# Patient Record
Sex: FEMALE | URBAN - METROPOLITAN AREA
[De-identification: names, ages, dates, MRNs, and addresses within clinical notes are randomized per-mention and may not be internally consistent; named-entity substitution may affect disease eponyms.]

---

## 2021-11-10 ENCOUNTER — APPOINTMENT (OUTPATIENT)
Dept: URBAN - METROPOLITAN AREA CLINIC 321 | Age: 21
Setting detail: DERMATOLOGY
End: 2021-11-11

## 2021-11-10 DIAGNOSIS — L91.0 HYPERTROPHIC SCAR: ICD-10-CM

## 2021-11-10 PROCEDURE — OTHER DEFER: OTHER

## 2021-11-10 PROCEDURE — 99203 OFFICE O/P NEW LOW 30 MIN: CPT

## 2021-11-10 PROCEDURE — OTHER COUNSELING: OTHER

## 2021-11-10 PROCEDURE — OTHER CONSULTATION FOR EXCISION: OTHER

## 2021-11-10 PROCEDURE — OTHER CONSULTATION FOR EXCISION OF BENIGN LESIONS: OTHER

## 2021-11-10 ASSESSMENT — LOCATION ZONE DERM: LOCATION ZONE: EAR

## 2021-11-10 ASSESSMENT — LOCATION DETAILED DESCRIPTION DERM
LOCATION DETAILED: RIGHT POSTERIOR EARLOBE
LOCATION DETAILED: LEFT POSTERIOR EARLOBE

## 2021-11-10 ASSESSMENT — LOCATION SIMPLE DESCRIPTION DERM
LOCATION SIMPLE: LEFT EAR
LOCATION SIMPLE: RIGHT EAR

## 2021-11-10 NOTE — PROCEDURE: DEFER
Procedure To Be Performed At Next Visit: Other
Introduction Text (Please End With A Colon): The following procedure was deferred: excision
Detail Level: Detailed
Other Procedure: Shave excision and ILK 40

## 2021-11-11 ENCOUNTER — APPOINTMENT (OUTPATIENT)
Dept: URBAN - METROPOLITAN AREA CLINIC 321 | Age: 21
Setting detail: DERMATOLOGY
End: 2021-11-12

## 2021-11-11 DIAGNOSIS — L91.0 HYPERTROPHIC SCAR: ICD-10-CM

## 2021-11-11 PROBLEM — D48.5 NEOPLASM OF UNCERTAIN BEHAVIOR OF SKIN: Status: ACTIVE | Noted: 2021-11-11

## 2021-11-11 PROCEDURE — OTHER INTRALESIONAL KENALOG: OTHER

## 2021-11-11 PROCEDURE — OTHER SHAVE REMOVAL: OTHER

## 2021-11-11 PROCEDURE — 11900 INJECT SKIN LESIONS </W 7: CPT | Mod: 59

## 2021-11-11 PROCEDURE — 11311 SHAVE SKIN LESION 0.6-1.0 CM: CPT

## 2021-11-11 PROCEDURE — 11311 SHAVE SKIN LESION 0.6-1.0 CM: CPT | Mod: 76

## 2021-11-11 ASSESSMENT — LOCATION DETAILED DESCRIPTION DERM
LOCATION DETAILED: LEFT POSTERIOR EARLOBE
LOCATION DETAILED: RIGHT POSTERIOR EARLOBE
LOCATION DETAILED: RIGHT ANTIHELIX

## 2021-11-11 ASSESSMENT — LOCATION SIMPLE DESCRIPTION DERM
LOCATION SIMPLE: LEFT EAR
LOCATION SIMPLE: RIGHT EAR

## 2021-11-11 ASSESSMENT — LOCATION ZONE DERM: LOCATION ZONE: EAR

## 2021-11-11 NOTE — PROCEDURE: SHAVE REMOVAL
Bill 30776 For Specimen Handling/Conveyance To Laboratory?: no
Consent was obtained from the patient. The risks and benefits to therapy were discussed in detail. Specifically, the risks of infection, scarring, bleeding, prolonged wound healing, incomplete removal, allergy to anesthesia, nerve injury and recurrence were addressed.
Hemostasis: Electrodesiccation
Medical Necessity Information: It is in your best interest to select a reason for this procedure from the list below. All of these items fulfill various CMS LCD requirements except the new and changing color options.
Anesthesia Volume In Cc: 2
Biopsy Method: 15 blade
Anesthesia Type: 0.5% lidocaine with 1:200,000 epinephrine and a 1:10 solution of 8.4% sodium bicarbonate
Medical Necessity Clause: This procedure was medically necessary because the lesion that was treated was:
Wound Care: Vaseline
Notification Instructions: Patient will be notified of pathology results. However, patient instructed to call the office if not contacted within 2 weeks.
Detail Level: Detailed
Render Post-Care Instructions In Note?: yes
X Size Of Lesion In Cm (Optional): 0
Post-Care Instructions: I reviewed with the patient in detail post-care instructions. Patient is to keep bandage on overnight, and then apply petrolatum daily until healed or may leave area dry and a scab will form which will eventually fall off in a few weeks.
Billing Type: Third-Party Bill
Size Of Lesion In Cm (Required): 1
Size Of Lesion In Cm (Required): 0.6

## 2021-11-24 ENCOUNTER — APPOINTMENT (OUTPATIENT)
Dept: URBAN - METROPOLITAN AREA CLINIC 321 | Age: 21
Setting detail: DERMATOLOGY
End: 2021-11-24

## 2021-11-24 DIAGNOSIS — L91.0 HYPERTROPHIC SCAR: ICD-10-CM

## 2021-11-24 PROCEDURE — OTHER COUNSELING: OTHER

## 2021-11-24 PROCEDURE — 11900 INJECT SKIN LESIONS </W 7: CPT

## 2021-11-24 PROCEDURE — OTHER INTRALESIONAL KENALOG: OTHER

## 2021-11-24 ASSESSMENT — LOCATION DETAILED DESCRIPTION DERM
LOCATION DETAILED: LEFT POSTERIOR EARLOBE
LOCATION DETAILED: RIGHT SUPERIOR HELIX
LOCATION DETAILED: RIGHT POSTERIOR EARLOBE

## 2021-11-24 ASSESSMENT — LOCATION ZONE DERM: LOCATION ZONE: EAR

## 2021-11-24 ASSESSMENT — LOCATION SIMPLE DESCRIPTION DERM
LOCATION SIMPLE: LEFT EAR
LOCATION SIMPLE: RIGHT EAR

## 2021-12-02 ENCOUNTER — APPOINTMENT (OUTPATIENT)
Dept: URBAN - METROPOLITAN AREA CLINIC 321 | Age: 21
Setting detail: DERMATOLOGY
End: 2021-12-02

## 2021-12-02 DIAGNOSIS — L91.0 HYPERTROPHIC SCAR: ICD-10-CM

## 2021-12-02 PROCEDURE — OTHER INTRALESIONAL KENALOG: OTHER

## 2021-12-02 PROCEDURE — OTHER COUNSELING: OTHER

## 2021-12-02 PROCEDURE — 11900 INJECT SKIN LESIONS </W 7: CPT

## 2021-12-02 ASSESSMENT — LOCATION DETAILED DESCRIPTION DERM
LOCATION DETAILED: RIGHT POSTERIOR EARLOBE
LOCATION DETAILED: LEFT POSTERIOR EARLOBE

## 2021-12-02 ASSESSMENT — LOCATION ZONE DERM: LOCATION ZONE: EAR

## 2021-12-02 ASSESSMENT — LOCATION SIMPLE DESCRIPTION DERM
LOCATION SIMPLE: LEFT EAR
LOCATION SIMPLE: RIGHT EAR

## 2021-12-16 ENCOUNTER — APPOINTMENT (OUTPATIENT)
Dept: URBAN - METROPOLITAN AREA CLINIC 321 | Age: 21
Setting detail: DERMATOLOGY
End: 2021-12-16

## 2021-12-16 DIAGNOSIS — L91.0 HYPERTROPHIC SCAR: ICD-10-CM

## 2021-12-16 PROCEDURE — 11900 INJECT SKIN LESIONS </W 7: CPT

## 2021-12-16 PROCEDURE — OTHER COUNSELING: OTHER

## 2021-12-16 PROCEDURE — OTHER INTRALESIONAL KENALOG: OTHER

## 2021-12-16 ASSESSMENT — LOCATION SIMPLE DESCRIPTION DERM
LOCATION SIMPLE: RIGHT EAR
LOCATION SIMPLE: LEFT EAR

## 2021-12-16 ASSESSMENT — LOCATION ZONE DERM: LOCATION ZONE: EAR

## 2021-12-16 ASSESSMENT — LOCATION DETAILED DESCRIPTION DERM
LOCATION DETAILED: RIGHT POSTERIOR EARLOBE
LOCATION DETAILED: LEFT POSTERIOR EARLOBE

## 2022-01-10 ENCOUNTER — OFFICE VISIT (OUTPATIENT)
Dept: OBGYN CLINIC | Facility: CLINIC | Age: 22
End: 2022-01-10
Payer: MEDICAID

## 2022-01-10 VITALS — WEIGHT: 219 LBS | DIASTOLIC BLOOD PRESSURE: 71 MMHG | SYSTOLIC BLOOD PRESSURE: 126 MMHG

## 2022-01-10 DIAGNOSIS — N89.8 VAGINAL ODOR: ICD-10-CM

## 2022-01-10 DIAGNOSIS — Z11.3 ROUTINE SCREENING FOR STI (SEXUALLY TRANSMITTED INFECTION): Primary | ICD-10-CM

## 2022-01-10 PROCEDURE — 3074F SYST BP LT 130 MM HG: CPT | Performed by: ADVANCED PRACTICE MIDWIFE

## 2022-01-10 PROCEDURE — 99202 OFFICE O/P NEW SF 15 MIN: CPT | Performed by: ADVANCED PRACTICE MIDWIFE

## 2022-01-10 PROCEDURE — 3078F DIAST BP <80 MM HG: CPT | Performed by: ADVANCED PRACTICE MIDWIFE

## 2022-01-10 RX ORDER — LEVONORGESTREL AND ETHINYL ESTRADIOL 0.1-0.02MG
1 KIT ORAL DAILY
COMMUNITY

## 2022-01-11 LAB
C TRACH DNA SPEC QL NAA+PROBE: NEGATIVE
N GONORRHOEA DNA SPEC QL NAA+PROBE: NEGATIVE

## 2022-01-12 LAB
GENITAL VAGINOSIS SCREEN: NEGATIVE
TRICHOMONAS SCREEN: NEGATIVE

## 2022-01-12 NOTE — PROGRESS NOTES
HPI:   Milad Ulloa is a 22year old female who presents for a gyne problem physical exam.Concerned about pH level of vagina. Sexually active one partner no STD screening.  Condoms but not every encounter      Wt Readings from Last 3 Encounters:  01/10/ not tender, no gross MSK abnormality  EXTREMITIES: no varicosities or edema  NEURO: Oriented times three, motor and sensory are grossly intact    ASSESSMENT AND PLAN:   Sneha Fontenot is a 22year old female who presents for a gyne problem physical exam.

## 2022-01-13 ENCOUNTER — APPOINTMENT (OUTPATIENT)
Dept: URBAN - METROPOLITAN AREA CLINIC 321 | Age: 22
Setting detail: DERMATOLOGY
End: 2022-01-13

## 2022-01-13 DIAGNOSIS — L91.0 HYPERTROPHIC SCAR: ICD-10-CM

## 2022-01-13 PROCEDURE — OTHER COUNSELING: OTHER

## 2022-01-13 PROCEDURE — 11900 INJECT SKIN LESIONS </W 7: CPT

## 2022-01-13 PROCEDURE — OTHER INTRALESIONAL KENALOG: OTHER

## 2022-01-13 ASSESSMENT — LOCATION ZONE DERM: LOCATION ZONE: EAR

## 2022-01-13 ASSESSMENT — LOCATION DETAILED DESCRIPTION DERM
LOCATION DETAILED: LEFT POSTERIOR EARLOBE
LOCATION DETAILED: RIGHT POSTERIOR EARLOBE

## 2022-01-13 ASSESSMENT — LOCATION SIMPLE DESCRIPTION DERM
LOCATION SIMPLE: LEFT EAR
LOCATION SIMPLE: RIGHT EAR

## 2022-03-03 ENCOUNTER — APPOINTMENT (OUTPATIENT)
Dept: URBAN - METROPOLITAN AREA CLINIC 321 | Age: 22
Setting detail: DERMATOLOGY
End: 2022-03-03

## 2022-03-03 DIAGNOSIS — L91.0 HYPERTROPHIC SCAR: ICD-10-CM

## 2022-03-03 PROCEDURE — OTHER INTRALESIONAL KENALOG: OTHER

## 2022-03-03 PROCEDURE — 11900 INJECT SKIN LESIONS </W 7: CPT

## 2022-03-03 PROCEDURE — OTHER COUNSELING: OTHER

## 2022-03-03 ASSESSMENT — LOCATION SIMPLE DESCRIPTION DERM
LOCATION SIMPLE: RIGHT EAR
LOCATION SIMPLE: LEFT EAR

## 2022-03-03 ASSESSMENT — LOCATION ZONE DERM: LOCATION ZONE: EAR

## 2022-03-03 ASSESSMENT — LOCATION DETAILED DESCRIPTION DERM
LOCATION DETAILED: RIGHT POSTERIOR EARLOBE
LOCATION DETAILED: LEFT POSTERIOR EARLOBE

## 2024-09-09 ENCOUNTER — APPOINTMENT (OUTPATIENT)
Dept: LAB | Age: 24
End: 2024-09-09

## 2025-07-16 ENCOUNTER — APPOINTMENT (OUTPATIENT)
Dept: URBAN - METROPOLITAN AREA CLINIC 244 | Age: 25
Setting detail: DERMATOLOGY
End: 2025-07-17

## 2025-07-16 DIAGNOSIS — L70.0 ACNE VULGARIS: ICD-10-CM

## 2025-07-16 PROCEDURE — OTHER COUNSELING: OTHER

## 2025-07-16 PROCEDURE — OTHER PRESCRIPTION: OTHER

## 2025-07-16 PROCEDURE — 99204 OFFICE O/P NEW MOD 45 MIN: CPT

## 2025-07-16 PROCEDURE — OTHER PRESCRIPTION MEDICATION MANAGEMENT: OTHER

## 2025-07-16 RX ORDER — TRETIONIN 0.25 MG/G
CREAM TOPICAL QHS
Qty: 45 | Refills: 3 | Status: ERX | COMMUNITY
Start: 2025-07-16

## 2025-07-16 RX ORDER — CLINDAMYCIN PHOSPHATE 10 MG/ML
LOTION TOPICAL DAILY
Qty: 60 | Refills: 6 | Status: ERX | COMMUNITY
Start: 2025-07-16

## 2025-07-16 ASSESSMENT — LOCATION DETAILED DESCRIPTION DERM
LOCATION DETAILED: RIGHT MEDIAL FOREHEAD
LOCATION DETAILED: LEFT INFERIOR CENTRAL MALAR CHEEK
LOCATION DETAILED: RIGHT INFERIOR CENTRAL MALAR CHEEK

## 2025-07-16 ASSESSMENT — LOCATION SIMPLE DESCRIPTION DERM
LOCATION SIMPLE: RIGHT FOREHEAD
LOCATION SIMPLE: RIGHT CHEEK
LOCATION SIMPLE: LEFT CHEEK

## 2025-07-16 ASSESSMENT — LOCATION ZONE DERM: LOCATION ZONE: FACE
